# Patient Record
Sex: MALE | Race: OTHER | NOT HISPANIC OR LATINO
[De-identification: names, ages, dates, MRNs, and addresses within clinical notes are randomized per-mention and may not be internally consistent; named-entity substitution may affect disease eponyms.]

---

## 2021-02-10 ENCOUNTER — TRANSCRIPTION ENCOUNTER (OUTPATIENT)
Age: 21
End: 2021-02-10

## 2021-09-06 ENCOUNTER — TRANSCRIPTION ENCOUNTER (OUTPATIENT)
Age: 21
End: 2021-09-06

## 2021-09-17 ENCOUNTER — TRANSCRIPTION ENCOUNTER (OUTPATIENT)
Age: 21
End: 2021-09-17

## 2023-09-01 ENCOUNTER — EMERGENCY (EMERGENCY)
Facility: HOSPITAL | Age: 23
LOS: 1 days | Discharge: HOME | End: 2023-09-01
Attending: EMERGENCY MEDICINE | Admitting: EMERGENCY MEDICINE
Payer: MEDICAID

## 2023-09-01 VITALS
DIASTOLIC BLOOD PRESSURE: 68 MMHG | SYSTOLIC BLOOD PRESSURE: 118 MMHG | WEIGHT: 160.06 LBS | RESPIRATION RATE: 18 BRPM | HEIGHT: 70 IN | OXYGEN SATURATION: 99 % | HEART RATE: 99 BPM | TEMPERATURE: 98 F

## 2023-09-01 PROCEDURE — 99284 EMERGENCY DEPT VISIT MOD MDM: CPT

## 2023-09-01 PROCEDURE — 90715 TDAP VACCINE 7 YRS/> IM: CPT

## 2023-09-01 PROCEDURE — 90471 IMMUNIZATION ADMIN: CPT

## 2023-09-01 PROCEDURE — 99053 MED SERV 10PM-8AM 24 HR FAC: CPT

## 2023-09-01 PROCEDURE — 12001 RPR S/N/AX/GEN/TRNK 2.5CM/<: CPT

## 2023-09-01 PROCEDURE — 99283 EMERGENCY DEPT VISIT LOW MDM: CPT | Mod: 25

## 2023-09-01 RX ORDER — ACETAMINOPHEN 500 MG
650 TABLET ORAL ONCE
Refills: 0 | Status: COMPLETED | OUTPATIENT
Start: 2023-09-01 | End: 2023-09-01

## 2023-09-01 RX ORDER — TETANUS TOXOID, REDUCED DIPHTHERIA TOXOID AND ACELLULAR PERTUSSIS VACCINE, ADSORBED 5; 2.5; 8; 8; 2.5 [IU]/.5ML; [IU]/.5ML; UG/.5ML; UG/.5ML; UG/.5ML
0.5 SUSPENSION INTRAMUSCULAR ONCE
Refills: 0 | Status: COMPLETED | OUTPATIENT
Start: 2023-09-01 | End: 2023-09-01

## 2023-09-01 RX ADMIN — Medication 650 MILLIGRAM(S): at 01:44

## 2023-09-01 RX ADMIN — TETANUS TOXOID, REDUCED DIPHTHERIA TOXOID AND ACELLULAR PERTUSSIS VACCINE, ADSORBED 0.5 MILLILITER(S): 5; 2.5; 8; 8; 2.5 SUSPENSION INTRAMUSCULAR at 01:15

## 2023-09-01 RX ADMIN — Medication 650 MILLIGRAM(S): at 01:14

## 2023-09-01 NOTE — ED PROVIDER NOTE - OBJECTIVE STATEMENT
23-year-old male presents the emergency department after laceration to the right index finger.  Patient is left-hand dominant.  He was using a set of new knives and cut the tip of his index finger while doing so.  Positive bleeding.  Patient states he is unsure if he needs stitches because the skin completely came off.

## 2023-09-01 NOTE — ED PROVIDER NOTE - CLINICAL SUMMARY MEDICAL DECISION MAKING FREE TEXT BOX
23-year-old male presents the emergency department after laceration to the right index finger.  Patient is left-hand dominant.  He was using a set of new knives and cut the tip of his index finger while doing so.  Positive bleeding.  Patient states he is unsure if he needs stitches because the skin completely came off.  Exam as stated. Unknown last tdap. Tylenol given. Will cleanse and halt bleeding with silver nitrate and/or surgicel.    Pt tolerated well. Observed. Hemostasis achieved. Stable for dc. Supplies provided. Worsening, continued or ANY new concerning symptoms return to the emergency department.

## 2023-09-01 NOTE — ED PROVIDER NOTE - PHYSICAL EXAMINATION
Radial Edge of right index finger tip avulsion with slow ooze/bleeding noted. Part of nail removed. No exposed bony tissue.   No other sign of injury.  Patient otherwise well appearing.

## 2023-09-01 NOTE — ED PROVIDER NOTE - NSFOLLOWUPINSTRUCTIONS_ED_ALL_ED_FT
Change dressing once daily. Do not remove the membranous dressing that is STUCK to the wound. It will fall off on its own. Keep dressings dry.   When the membranous dressing falls off, okay to reduce bandaging to a band-aid.   Take Ibuprofen 600mg or Acetaminophen 650mg every 6 hours as needed for pain.     SEEK IMMEDIATE MEDICAL CARE IF YOU HAVE ANY OF THE FOLLOWING SYMPTOMS: fever, red streaks coming from affected area, vomiting or diarrhea, or dizziness/lightheadedness.

## 2023-09-01 NOTE — ED PROVIDER NOTE - PATIENT PORTAL LINK FT
You can access the FollowMyHealth Patient Portal offered by [facility] by registering at the following website: [email or web address]. By joining [facility]’s FollowMyHealth portal, you will also be able to view your health information using other applications (apps) compatible with our system.

## 2023-09-18 ENCOUNTER — NON-APPOINTMENT (OUTPATIENT)
Age: 23
End: 2023-09-18
